# Patient Record
Sex: MALE | Race: OTHER | HISPANIC OR LATINO | Employment: FULL TIME | URBAN - NONMETROPOLITAN AREA
[De-identification: names, ages, dates, MRNs, and addresses within clinical notes are randomized per-mention and may not be internally consistent; named-entity substitution may affect disease eponyms.]

---

## 2022-03-10 ENCOUNTER — OFFICE VISIT (OUTPATIENT)
Dept: MEDICAL GROUP | Facility: PHYSICIAN GROUP | Age: 47
End: 2022-03-10
Payer: COMMERCIAL

## 2022-03-10 VITALS
TEMPERATURE: 96.6 F | RESPIRATION RATE: 16 BRPM | WEIGHT: 202.16 LBS | BODY MASS INDEX: 28.94 KG/M2 | OXYGEN SATURATION: 97 % | HEART RATE: 78 BPM | SYSTOLIC BLOOD PRESSURE: 152 MMHG | HEIGHT: 70 IN | DIASTOLIC BLOOD PRESSURE: 110 MMHG

## 2022-03-10 DIAGNOSIS — H91.93 BILATERAL HEARING LOSS, UNSPECIFIED HEARING LOSS TYPE: ICD-10-CM

## 2022-03-10 PROCEDURE — 99203 OFFICE O/P NEW LOW 30 MIN: CPT | Mod: 25 | Performed by: STUDENT IN AN ORGANIZED HEALTH CARE EDUCATION/TRAINING PROGRAM

## 2022-03-10 PROCEDURE — 69209 REMOVE IMPACTED EAR WAX UNI: CPT | Mod: 50 | Performed by: STUDENT IN AN ORGANIZED HEALTH CARE EDUCATION/TRAINING PROGRAM

## 2022-03-10 ASSESSMENT — ENCOUNTER SYMPTOMS
CHILLS: 0
HEADACHES: 0
COUGH: 0
FEVER: 0
BLURRED VISION: 0
DIZZINESS: 1

## 2022-03-10 ASSESSMENT — PATIENT HEALTH QUESTIONNAIRE - PHQ9: CLINICAL INTERPRETATION OF PHQ2 SCORE: 0

## 2022-03-10 NOTE — ASSESSMENT & PLAN NOTE
Bilateral cerumen removal via irrigation performed today.  Patient symptoms resolved thereafter.  I advised him to stop using Q-tips as it seems that he is packing the cerumen into the ear canal.  Follow-up in 1 to 2 weeks for an establish care visit

## 2022-03-10 NOTE — PROGRESS NOTES
"Subjective:        HISTORY OF THE PRESENT ILLNESS: Patient is a 46 y.o. male.  Presenting for:       Problem   Bilateral Hearing Loss    More loss in the left than the right for 1 week now.     Reports some dizziness  No fevers, denies nausea or vomiting  Some ringing in his ears.     Had had this before needed to get his ear cleaned.   No recent illness.     Uses qtips.     He denies getting hit in head  No blurry vision    No medications, no drugs.         No current Epic-ordered outpatient medications on file.     No current Epic-ordered facility-administered medications on file.       No past medical history on file.  No past surgical history on file.      No family history on file.  No current outpatient medications on file.     No current facility-administered medications for this visit.         Review of Systems   Constitutional: Negative for chills and fever.   HENT: Positive for ear pain and hearing loss. Negative for congestion.    Eyes: Negative for blurred vision.   Respiratory: Negative for cough.    Cardiovascular: Negative for chest pain.   Neurological: Positive for dizziness. Negative for headaches.          Objective:     Exam: /110   Pulse 78   Temp 35.9 °C (96.6 °F) (Temporal)   Resp 16   Ht 1.778 m (5' 10\")   Wt 91.7 kg (202 lb 2.6 oz)   SpO2 97%  Body mass index is 29.01 kg/m².    Physical Exam  Constitutional:       Appearance: Normal appearance. He is obese. He is not ill-appearing or diaphoretic.   HENT:      Right Ear: External ear normal. There is impacted cerumen.      Left Ear: External ear normal. There is impacted cerumen.   Eyes:      General: No scleral icterus.        Right eye: No discharge.         Left eye: No discharge.   Cardiovascular:      Rate and Rhythm: Normal rate and regular rhythm.      Pulses: Normal pulses.      Heart sounds: Normal heart sounds. No murmur heard.  Pulmonary:      Effort: Pulmonary effort is normal. No respiratory distress.      Breath " sounds: Normal breath sounds.   Neurological:      Mental Status: He is alert.            Assessment & Plan:   46 y.o. male with the following -      Problem List Items Addressed This Visit     Bilateral hearing loss     Bilateral cerumen removal via irrigation performed today.  Patient symptoms resolved thereafter.  I advised him to stop using Q-tips as it seems that he is packing the cerumen into the ear canal.  Follow-up in 1 to 2 weeks for an establish care visit

## 2022-03-25 ENCOUNTER — OFFICE VISIT (OUTPATIENT)
Dept: MEDICAL GROUP | Facility: PHYSICIAN GROUP | Age: 47
End: 2022-03-25
Payer: COMMERCIAL

## 2022-03-25 VITALS
TEMPERATURE: 98.1 F | RESPIRATION RATE: 16 BRPM | SYSTOLIC BLOOD PRESSURE: 144 MMHG | WEIGHT: 204.81 LBS | DIASTOLIC BLOOD PRESSURE: 88 MMHG | HEIGHT: 64 IN | HEART RATE: 90 BPM | BODY MASS INDEX: 34.97 KG/M2 | OXYGEN SATURATION: 97 %

## 2022-03-25 DIAGNOSIS — E66.9 OBESITY (BMI 30-39.9): ICD-10-CM

## 2022-03-25 DIAGNOSIS — Z00.00 HEALTH CARE MAINTENANCE: ICD-10-CM

## 2022-03-25 DIAGNOSIS — Z23 NEED FOR VACCINATION: ICD-10-CM

## 2022-03-25 DIAGNOSIS — I10 HYPERTENSION, UNSPECIFIED TYPE: ICD-10-CM

## 2022-03-25 PROCEDURE — 90471 IMMUNIZATION ADMIN: CPT | Performed by: STUDENT IN AN ORGANIZED HEALTH CARE EDUCATION/TRAINING PROGRAM

## 2022-03-25 PROCEDURE — 90715 TDAP VACCINE 7 YRS/> IM: CPT | Performed by: STUDENT IN AN ORGANIZED HEALTH CARE EDUCATION/TRAINING PROGRAM

## 2022-03-25 PROCEDURE — 99214 OFFICE O/P EST MOD 30 MIN: CPT | Mod: 25 | Performed by: STUDENT IN AN ORGANIZED HEALTH CARE EDUCATION/TRAINING PROGRAM

## 2022-03-25 RX ORDER — BLOOD PRESSURE TEST KIT
1 KIT MISCELLANEOUS ONCE
Qty: 1 KIT | Refills: 0 | Status: SHIPPED | OUTPATIENT
Start: 2022-03-25 | End: 2022-03-25

## 2022-03-25 RX ORDER — LISINOPRIL 10 MG/1
10 TABLET ORAL DAILY
Qty: 30 TABLET | Refills: 1 | Status: SHIPPED | OUTPATIENT
Start: 2022-03-25 | End: 2022-10-11

## 2022-03-25 ASSESSMENT — ENCOUNTER SYMPTOMS
DIZZINESS: 0
TREMORS: 0
FEVER: 0
HEADACHES: 0
SHORTNESS OF BREATH: 0
DIAPHORESIS: 0
CHILLS: 0
COUGH: 0
ABDOMINAL PAIN: 0
SPUTUM PRODUCTION: 0

## 2022-03-25 NOTE — ASSESSMENT & PLAN NOTE
Discussed diet, exercise, low-salt diet today.  Advised would have improvement on his blood pressures as well.  Patient agrees and understands.

## 2022-03-25 NOTE — ASSESSMENT & PLAN NOTE
Declines flu shot.  Tdap given today.  -No family history of colon cancer  -Family history, surgical history, social history reviewed today.  -Lipid panel, CBC, CMP, TSH ordered   -STD, HIV screening declined

## 2022-03-25 NOTE — PATIENT INSTRUCTIONS
Cómo tomarse la presión arterial  How to Take Your Blood Pressure  Usted puede annalise champagne presión arterial en casa con un aparato. Es posible que tenga que annalise champagne presión arterial en casa:  · Para pantera si tiene presión arterial elevada (hipertensión).  · Para controlar champagne presión arterial a lo leonarda del tiempo.  · Para asegurarse de que el medicamento para la presión arterial esté funcionando.  Materiales necesarios:  Necesitará un aparato de medición de la presión arterial o tensiómetro. Puede comprar latia en jamey farmacia o en línea. Al escoger latia:  · Escoja latia que tenga un brazalete.  · Escoja latia que se envuelva en la parte superior de champagne brazo. Debe poder meter únicamente un dedo entre el brazalete y el brazo.  · No escoja latia que mida champagne presión arterial en la gabby o el dedo.  Champagne médico puede sugerirle un tensiómetro.  Cómo prepararse  Evite realizar lo siguiente brianne los 30 minutos anteriores a controlar champagne presión arterial:  · Beber cafeína.  · Consumir alcohol.  · [place].  · Fumar.  · Realizar actividad física.  Ignacio minutos antes de controlar champagne presión arterial:  · Orine.  · Siéntese en jamey silla de comedor. Evite sentarse en un sillón blando o sofá.  · Esté tranquilo. No hable.  Cómo tomarse la presión arterial  Siga las instrucciones que vienen con el aparato. Si tiene un tensiómetro digital, las instrucciones podrían ser las siguientes:  1. Siéntese con la espalda recta.  2. Coloque los pies en el piso. No cruce los tobillos o las piernas.  3. Apoye el brazo bang al nivel del corazón. Puede apoyarlo en jamey mcdowell, escritorio o silla.  4. Arremánguese.  5. Envuelva la parte superior de champagne brazo bang con el brazalete. El brazalete debe estar 1 pulgada (2,5 cm) sobre champagne codo. Es mejor envolver el brazalete alrededor de la piel desnuda.  6. Ajuste el brazalete alrededor de champagne brazo. Debe poder meter únicamente un dedo entre el brazalete y el brazo.  7. Coloque el cable dentro del surco de champagne  "codo.  8. Presione el botón de encendido.  9. Quédese sentado tranquilamente mientras el brazalete se infla y se desinfla.  10. Escriba los números que se muestran en la pantalla.  11. Espere 2 o 3 minutos y repita los pasos 1 al 10.  ¿Qué significan los números?  Dos números conforman la presión arterial. El primer número es la presión sistólica. El jose número es la presión diastólica. Un ejemplo de lectura de presión arterial sería \"120 sobre 80\" (o 120/80).  Si es adulto y no tiene ninguna afección, use esta guía para saber si champagne presión arterial es normal:  Normal  · Primer número: debajo de 120.  · [place] número: debajo de 80.  [place]  · Primer número: 120-129.  · [place] número: debajo de 80.  Etapa 1 de hipertensión  · Primer número: 130-139.  · [place] número: 80-89.  Etapa 2 de hipertensión  · Primer número: 140 o más.  · [place] número: 90 o más.  Champagne presión arterial se encuentra por encima del nivel normal incluso si únicamente el número superior o el inferior es mayor que el normal.  Siga estas instrucciones en champagne casa:  · Controle champagne presión arterial con la frecuencia que le indique champagne médico.  · Lleve el tensiómetro a champagne próxima kim con el médico. Champagne médico:  ? Se asegurará de que lo esté usando correctamente.  ? Se asegurará de que funcione demetris.  · Se asegurará de que entienda cuáles deben ser pillo números de presión arterial.  · Dígale al médico si pillo medicamentos le causan efectos secundarios.  Comuníquese con un médico si:  · Champagne presión arterial sigue jewell.  Solicite ayuda de inmediato si:  · Champagne primer número de presión arterial es más alto que 180.  · Champagne jose número de presión arterial es más alto que 120.  Esta información no tiene shobha fin reemplazar el consejo del médico. Asegúrese de hacerle al médico cualquier pregunta que tenga.  Document Released: 04/03/2012 Document Revised: 02/21/2018 Document Reviewed: 05/26/2017  Elsevier Patient Education © 2020 [employer]    "

## 2022-03-25 NOTE — PROGRESS NOTES
HISTORY OF PRESENT ILLNESS: Jan is a pleasant 46 y.o. male, established patient who presents today to discuss medical problems as listed below:    Problem   High Blood Pressure    Elevated blood pressures in office last two times. Patient reports never been told this before.    Denies chest pain, Denson, edema, dizziness, headaches.  reprots sometime his vision is blurry but not at the moment.     He snores loud at night.  Does not wake up short of breath, denies being tired though out the day.      Obesity (Bmi 30-39.9)   Health Care Maintenance         Current Outpatient Medications Ordered in Epic   Medication Sig Dispense Refill   • Blood Pressure Kit 1 Each one time for 1 dose. 1 Kit 0   • lisinopril (PRINIVIL) 10 MG Tab Take 1 Tablet by mouth every day. 30 Tablet 1     No current Epic-ordered facility-administered medications on file.       Review of Systems   Constitutional: Negative for chills, diaphoresis, fever and malaise/fatigue.   Respiratory: Negative for cough, sputum production and shortness of breath.    Cardiovascular: Negative for chest pain.   Gastrointestinal: Negative for abdominal pain.   Neurological: Negative for dizziness, tremors and headaches.        No past medical history on file.  No past surgical history on file.  Social History     Tobacco Use   • Smoking status: Never Smoker   • Smokeless tobacco: Never Used   Vaping Use   • Vaping Use: Never used   Substance Use Topics   • Alcohol use: Not Currently     Comment: OCC   • Drug use: Never      Family History   Problem Relation Age of Onset   • Diabetes Mother    • Diabetes Father    • No Known Problems Brother      Current Outpatient Medications   Medication Sig Dispense Refill   • Blood Pressure Kit 1 Each one time for 1 dose. 1 Kit 0   • lisinopril (PRINIVIL) 10 MG Tab Take 1 Tablet by mouth every day. 30 Tablet 1     No current facility-administered medications for this visit.       Allergies:  No Known Allergies    Allergies, past  "medical history, past surgical history, family history, social history reviewed and updated.    Objective:    /88   Pulse 90   Temp 36.7 °C (98.1 °F) (Temporal)   Resp 16   Ht 1.62 m (5' 3.78\")   Wt 92.9 kg (204 lb 12.9 oz)   SpO2 97%   BMI 35.40 kg/m²    Body mass index is 35.4 kg/m².    Physical Exam  Vitals reviewed.   Constitutional:       General: He is not in acute distress.     Appearance: Normal appearance. He is not ill-appearing or toxic-appearing.   HENT:      Head: Normocephalic and atraumatic.      Mouth/Throat:      Pharynx: No posterior oropharyngeal erythema.   Eyes:      General: No scleral icterus.     Extraocular Movements: Extraocular movements intact.      Conjunctiva/sclera: Conjunctivae normal.   Cardiovascular:      Rate and Rhythm: Normal rate and regular rhythm.      Pulses: Normal pulses.      Heart sounds: Normal heart sounds. No murmur heard.  Pulmonary:      Effort: Pulmonary effort is normal. No respiratory distress.      Breath sounds: Normal breath sounds. No wheezing or rales.   Abdominal:      General: Abdomen is flat. Bowel sounds are normal. There is no distension.      Palpations: Abdomen is soft. There is no mass.      Tenderness: There is no abdominal tenderness.      Hernia: No hernia is present.   Musculoskeletal:      Cervical back: Neck supple.      Right lower leg: No edema.      Left lower leg: No edema.   Lymphadenopathy:      Cervical: No cervical adenopathy.   Skin:     General: Skin is warm and dry.   Neurological:      General: No focal deficit present.      Mental Status: He is alert and oriented to person, place, and time.   Psychiatric:         Mood and Affect: Mood normal.         Thought Content: Thought content normal.            Assessment/Plan:    Problem List Items Addressed This Visit     High blood pressure     Patient has moderately elevated blood pressures on 2 occasions now.  Asymptomatic.  We will start him on lisinopril 10 mg daily.  I " sent a prescription over for a blood pressure machine.  He is to check his blood pressures and log them prior to returning to his next visit in 1 month.  I given him directions on how to check his blood pressure.  At that time we will do a EKG for a baseline reading.    This is a new diagnosis with medical management.         Relevant Medications    lisinopril (PRINIVIL) 10 MG Tab    Other Relevant Orders    Comp Metabolic Panel    URINALYSIS    TSH WITH REFLEX TO FT4    Obesity (BMI 30-39.9)     Discussed diet, exercise, low-salt diet today.  Advised would have improvement on his blood pressures as well.  Patient agrees and understands.         Health care maintenance     Declines flu shot.  Tdap given today.  -No family history of colon cancer  -Family history, surgical history, social history reviewed today.  -Lipid panel, CBC, CMP, TSH ordered   -STD, HIV screening declined         Relevant Orders    Lipid Profile    CBC WITHOUT DIFFERENTIAL    HEMOGLOBIN A1C      Other Visit Diagnoses     Need for vaccination        Relevant Medications    Blood Pressure Kit    Other Relevant Orders    Tdap Vaccine =>8YO IM (Completed)    Influenza Vaccine Quad Injection (PF)           Return in about 4 weeks (around 4/22/2022).

## 2022-03-25 NOTE — ASSESSMENT & PLAN NOTE
Patient has moderately elevated blood pressures on 2 occasions now.  Asymptomatic.  We will start him on lisinopril 10 mg daily.  I sent a prescription over for a blood pressure machine.  He is to check his blood pressures and log them prior to returning to his next visit in 1 month.  I given him directions on how to check his blood pressure.  At that time we will do a EKG for a baseline reading.    This is a new diagnosis with medical management.

## 2022-03-26 ENCOUNTER — HOSPITAL ENCOUNTER (OUTPATIENT)
Dept: LAB | Facility: MEDICAL CENTER | Age: 47
End: 2022-03-26
Attending: STUDENT IN AN ORGANIZED HEALTH CARE EDUCATION/TRAINING PROGRAM
Payer: COMMERCIAL

## 2022-03-26 DIAGNOSIS — I10 HYPERTENSION, UNSPECIFIED TYPE: ICD-10-CM

## 2022-03-26 DIAGNOSIS — Z00.00 HEALTH CARE MAINTENANCE: ICD-10-CM

## 2022-03-26 LAB
ALBUMIN SERPL BCP-MCNC: 4.4 G/DL (ref 3.2–4.9)
ALBUMIN/GLOB SERPL: 1.7 G/DL
ALP SERPL-CCNC: 77 U/L (ref 30–99)
ALT SERPL-CCNC: 35 U/L (ref 2–50)
ANION GAP SERPL CALC-SCNC: 11 MMOL/L (ref 7–16)
AST SERPL-CCNC: 24 U/L (ref 12–45)
BILIRUB SERPL-MCNC: 1.2 MG/DL (ref 0.1–1.5)
BUN SERPL-MCNC: 17 MG/DL (ref 8–22)
CALCIUM SERPL-MCNC: 9.3 MG/DL (ref 8.5–10.5)
CHLORIDE SERPL-SCNC: 104 MMOL/L (ref 96–112)
CHOLEST SERPL-MCNC: 197 MG/DL (ref 100–199)
CO2 SERPL-SCNC: 23 MMOL/L (ref 20–33)
CREAT SERPL-MCNC: 0.75 MG/DL (ref 0.5–1.4)
ERYTHROCYTE [DISTWIDTH] IN BLOOD BY AUTOMATED COUNT: 40.2 FL (ref 35.9–50)
EST. AVERAGE GLUCOSE BLD GHB EST-MCNC: 128 MG/DL
FASTING STATUS PATIENT QL REPORTED: NORMAL
GFR SERPLBLD CREATININE-BSD FMLA CKD-EPI: 113 ML/MIN/1.73 M 2
GLOBULIN SER CALC-MCNC: 2.6 G/DL (ref 1.9–3.5)
GLUCOSE SERPL-MCNC: 112 MG/DL (ref 65–99)
HBA1C MFR BLD: 6.1 % (ref 4–5.6)
HCT VFR BLD AUTO: 49.7 % (ref 42–52)
HDLC SERPL-MCNC: 34 MG/DL
HGB BLD-MCNC: 16.7 G/DL (ref 14–18)
LDLC SERPL CALC-MCNC: 111 MG/DL
MCH RBC QN AUTO: 27.9 PG (ref 27–33)
MCHC RBC AUTO-ENTMCNC: 33.6 G/DL (ref 33.7–35.3)
MCV RBC AUTO: 83.1 FL (ref 81.4–97.8)
PLATELET # BLD AUTO: 326 K/UL (ref 164–446)
PMV BLD AUTO: 10.3 FL (ref 9–12.9)
POTASSIUM SERPL-SCNC: 4 MMOL/L (ref 3.6–5.5)
PROT SERPL-MCNC: 7 G/DL (ref 6–8.2)
RBC # BLD AUTO: 5.98 M/UL (ref 4.7–6.1)
SODIUM SERPL-SCNC: 138 MMOL/L (ref 135–145)
TRIGL SERPL-MCNC: 262 MG/DL (ref 0–149)
TSH SERPL DL<=0.005 MIU/L-ACNC: 1.87 UIU/ML (ref 0.38–5.33)
WBC # BLD AUTO: 8.8 K/UL (ref 4.8–10.8)

## 2022-03-26 PROCEDURE — 85027 COMPLETE CBC AUTOMATED: CPT

## 2022-03-26 PROCEDURE — 80053 COMPREHEN METABOLIC PANEL: CPT

## 2022-03-26 PROCEDURE — 84443 ASSAY THYROID STIM HORMONE: CPT

## 2022-03-26 PROCEDURE — 83036 HEMOGLOBIN GLYCOSYLATED A1C: CPT

## 2022-03-26 PROCEDURE — 80061 LIPID PANEL: CPT

## 2022-03-26 PROCEDURE — 36415 COLL VENOUS BLD VENIPUNCTURE: CPT

## 2022-04-26 ENCOUNTER — OFFICE VISIT (OUTPATIENT)
Dept: MEDICAL GROUP | Facility: PHYSICIAN GROUP | Age: 47
End: 2022-04-26
Payer: COMMERCIAL

## 2022-04-26 VITALS
OXYGEN SATURATION: 96 % | HEART RATE: 92 BPM | TEMPERATURE: 98.5 F | DIASTOLIC BLOOD PRESSURE: 88 MMHG | BODY MASS INDEX: 27.43 KG/M2 | HEIGHT: 70 IN | WEIGHT: 191.58 LBS | SYSTOLIC BLOOD PRESSURE: 130 MMHG | RESPIRATION RATE: 16 BRPM

## 2022-04-26 DIAGNOSIS — I10 PRIMARY HYPERTENSION: ICD-10-CM

## 2022-04-26 DIAGNOSIS — E78.2 MIXED HYPERLIPIDEMIA: ICD-10-CM

## 2022-04-26 PROBLEM — H91.93 BILATERAL HEARING LOSS: Status: RESOLVED | Noted: 2022-03-10 | Resolved: 2022-04-26

## 2022-04-26 PROCEDURE — 99213 OFFICE O/P EST LOW 20 MIN: CPT | Performed by: STUDENT IN AN ORGANIZED HEALTH CARE EDUCATION/TRAINING PROGRAM

## 2022-04-26 ASSESSMENT — FIBROSIS 4 INDEX: FIB4 SCORE: 0.56

## 2022-04-26 NOTE — PROGRESS NOTES
HISTORY OF PRESENT ILLNESS: Jan is a pleasant 46 y.o. male, established  patient who presents today to discuss medical problems as listed below:    Problem   Mixed Hyperlipidemia   High Blood Pressure    Patient was started on lisinopril 10 mg daily at the last visit.  His blood pressures are now well controlled amatory blood pressures running about 130 systolic over 80s diastolic.  He denies any chest pain, dyspnea on exertion, dizziness, headaches, edema.  He feels well.  He had a recent set of labs done     Bilateral Hearing Loss (Resolved)    More loss in the left than the right for 1 week now.     Reports some dizziness  No fevers, denies nausea or vomiting  Some ringing in his ears.     Had had this before needed to get his ear cleaned.   No recent illness.     Uses qtips.     He denies getting hit in head  No blurry vision    No medications, no drugs.           Current Outpatient Medications Ordered in Epic   Medication Sig Dispense Refill   • lisinopril (PRINIVIL) 10 MG Tab Take 1 Tablet by mouth every day. 30 Tablet 1     No current Epic-ordered facility-administered medications on file.       History reviewed. No pertinent past medical history.  History reviewed. No pertinent surgical history.  Social History     Tobacco Use   • Smoking status: Never Smoker   • Smokeless tobacco: Never Used   Vaping Use   • Vaping Use: Never used   Substance Use Topics   • Alcohol use: Not Currently     Comment: OCC   • Drug use: Never      Family History   Problem Relation Age of Onset   • Diabetes Mother    • Diabetes Father    • No Known Problems Brother      Current Outpatient Medications   Medication Sig Dispense Refill   • lisinopril (PRINIVIL) 10 MG Tab Take 1 Tablet by mouth every day. 30 Tablet 1     No current facility-administered medications for this visit.       Allergies:  No Known Allergies    Allergies, past medical history, past surgical history, family history, social history reviewed and  "updated.    Objective:    /88   Pulse 92   Temp 36.9 °C (98.5 °F) (Temporal)   Resp 16   Ht 1.778 m (5' 10\")   Wt 86.9 kg (191 lb 9.3 oz)   SpO2 96%   BMI 27.49 kg/m²    Body mass index is 27.49 kg/m².    Physical Exam  Vitals reviewed.   Constitutional:       General: He is not in acute distress.     Appearance: Normal appearance. He is not ill-appearing or toxic-appearing.   HENT:      Head: Normocephalic and atraumatic.   Eyes:      General: No scleral icterus.        Right eye: No discharge.         Left eye: No discharge.      Conjunctiva/sclera: Conjunctivae normal.   Neck:      Vascular: No carotid bruit.   Cardiovascular:      Rate and Rhythm: Normal rate and regular rhythm.      Pulses: Normal pulses.      Heart sounds: Normal heart sounds. No murmur heard.  Pulmonary:      Effort: Pulmonary effort is normal. No respiratory distress.      Breath sounds: Normal breath sounds. No wheezing or rales.   Abdominal:      General: Abdomen is flat. Bowel sounds are normal. There is no distension.      Palpations: Abdomen is soft. There is no mass.      Tenderness: There is no abdominal tenderness. There is no guarding or rebound.   Musculoskeletal:      Cervical back: Neck supple.      Right lower leg: No edema.      Left lower leg: No edema.   Skin:     General: Skin is warm and dry.   Neurological:      General: No focal deficit present.      Mental Status: He is alert.   Psychiatric:         Mood and Affect: Mood normal.         Thought Content: Thought content normal.          LABS:   Results for GIUSEPPE GOMES (MRN [id number]) as of 4/26/2022 15:23   Ref. Range 3/26/2022 11:05   WBC Latest Ref Range: 4.8 - 10.8 K/uL 8.8   RBC Latest Ref Range: 4.70 - 6.10 M/uL 5.98   Hemoglobin Latest Ref Range: 14.0 - 18.0 g/dL 16.7   Hematocrit Latest Ref Range: 42.0 - 52.0 % 49.7   MCV Latest Ref Range: 81.4 - 97.8 fL 83.1   MCH Latest Ref Range: 27.0 - 33.0 pg 27.9   MCHC Latest Ref Range: 33.7 - 35.3 " g/dL 33.6 (L)   RDW Latest Ref Range: 35.9 - 50.0 fL 40.2   Platelet Count Latest Ref Range: 164 - 446 K/uL 326   MPV Latest Ref Range: 9.0 - 12.9 fL 10.3   Sodium Latest Ref Range: 135 - 145 mmol/L 138   Potassium Latest Ref Range: 3.6 - 5.5 mmol/L 4.0   Chloride Latest Ref Range: 96 - 112 mmol/L 104   Co2 Latest Ref Range: 20 - 33 mmol/L 23   Anion Gap Latest Ref Range: 7.0 - 16.0  11.0   Glucose Latest Ref Range: 65 - 99 mg/dL 112 (H)   Bun Latest Ref Range: 8 - 22 mg/dL 17   Creatinine Latest Ref Range: 0.50 - 1.40 mg/dL 0.75   GFR (CKD-EPI) Latest Ref Range: >60 mL/min/1.73 m 2 113   Calcium Latest Ref Range: 8.5 - 10.5 mg/dL 9.3   AST(SGOT) Latest Ref Range: 12 - 45 U/L 24   ALT(SGPT) Latest Ref Range: 2 - 50 U/L 35   Alkaline Phosphatase Latest Ref Range: 30 - 99 U/L 77   Total Bilirubin Latest Ref Range: 0.1 - 1.5 mg/dL 1.2   Albumin Latest Ref Range: 3.2 - 4.9 g/dL 4.4   Total Protein Latest Ref Range: 6.0 - 8.2 g/dL 7.0   Globulin Latest Ref Range: 1.9 - 3.5 g/dL 2.6   A-G Ratio Latest Units: g/dL 1.7   Glycohemoglobin Latest Ref Range: 4.0 - 5.6 % 6.1 (H)   Estim. Avg Glu Latest Units: mg/dL 128   Fasting Status Unknown Fasting   Cholesterol,Tot Latest Ref Range: 100 - 199 mg/dL 197   Triglycerides Latest Ref Range: 0 - 149 mg/dL 262 (H)   HDL Latest Ref Range: >=40 mg/dL 34 (A)   LDL Latest Ref Range: <100 mg/dL 111 (H)   TSH Latest Ref Range: 0.380 - 5.330 uIU/mL 1.870           Assessment/Plan:    Problem List Items Addressed This Visit     High blood pressure     EKG in office today for initial diagnosis of hypertension.  EKG interpretation: Sinus rate and rhythm, normal axis, no T wave inversions.  No ST elevations.  Does not meet criteria for left ventricular hypertrophy.  Intervals within normal limits.    Creatinine, GFR within normal limits.    Continue amlodipine 10 mg daily return to care in 4 months for blood pressure check.           Relevant Orders    EKG - Clinic Performed    Mixed  hyperlipidemia     ASCVD risk less than 5%.  Discussed diet, exercise.                Return in about 4 months (around 8/26/2022) for blood pressure.   .

## 2022-04-26 NOTE — ASSESSMENT & PLAN NOTE
EKG in office today for initial diagnosis of hypertension.  EKG interpretation: Sinus rate and rhythm, normal axis, no T wave inversions.  No ST elevations.  Does not meet criteria for left ventricular hypertrophy.  Intervals within normal limits.    Creatinine, GFR within normal limits.    Continue amlodipine 10 mg daily return to care in 4 months for blood pressure check.

## 2022-09-09 ENCOUNTER — OFFICE VISIT (OUTPATIENT)
Dept: MEDICAL GROUP | Facility: PHYSICIAN GROUP | Age: 47
End: 2022-09-09
Payer: COMMERCIAL

## 2022-09-09 VITALS
RESPIRATION RATE: 16 BRPM | WEIGHT: 181.88 LBS | TEMPERATURE: 97.5 F | DIASTOLIC BLOOD PRESSURE: 94 MMHG | BODY MASS INDEX: 26.04 KG/M2 | HEIGHT: 70 IN | OXYGEN SATURATION: 97 % | HEART RATE: 89 BPM | SYSTOLIC BLOOD PRESSURE: 139 MMHG

## 2022-09-09 DIAGNOSIS — I10 HYPERTENSION, UNSPECIFIED TYPE: ICD-10-CM

## 2022-09-09 DIAGNOSIS — R73.03 PREDIABETES: ICD-10-CM

## 2022-09-09 DIAGNOSIS — Z23 NEED FOR VACCINATION: ICD-10-CM

## 2022-09-09 PROCEDURE — 90746 HEPB VACCINE 3 DOSE ADULT IM: CPT | Performed by: STUDENT IN AN ORGANIZED HEALTH CARE EDUCATION/TRAINING PROGRAM

## 2022-09-09 PROCEDURE — 90471 IMMUNIZATION ADMIN: CPT | Performed by: STUDENT IN AN ORGANIZED HEALTH CARE EDUCATION/TRAINING PROGRAM

## 2022-09-09 PROCEDURE — 99213 OFFICE O/P EST LOW 20 MIN: CPT | Mod: 25 | Performed by: STUDENT IN AN ORGANIZED HEALTH CARE EDUCATION/TRAINING PROGRAM

## 2022-09-09 ASSESSMENT — FIBROSIS 4 INDEX: FIB4 SCORE: 0.57

## 2022-09-10 NOTE — ASSESSMENT & PLAN NOTE
Reeducated patient on high blood pressure what his goals are and that he needs to start taking his medications daily.  Blood pressures today repeat 139/94.  Patient has been doing well with losing weight and cutting out sugars, salt, processed foods.    Chronic condition and exacerbation.     Advised patient to return to care if pressures are still elevated at home, otherwise return to care in 4 months for blood pressure check.

## 2022-09-10 NOTE — PROGRESS NOTES
"  HISTORY OF PRESENT ILLNESS: Jan is a pleasant 47 y.o. male, established patient who presents today to discuss medical problems as listed below:    Problem   Prediabetes   High Blood Pressure    Patient is here to follow-up on an emergency room encounter.  He originally went for abdominal pain was found to have gastroenteritis that is now resolved.  During the ER visit he was noted to have significantly elevated blood pressures and was told to follow-up with his primary care provider.  Today he reports that he has not been using his lisinopril because he thought that his blood pressure had \"resolved\".  Denies any chest pain, dyspnea on exertion, dizziness, edema, orthopnea, palpitations.  He feels well today          Current Outpatient Medications Ordered in Epic   Medication Sig Dispense Refill    lisinopril (PRINIVIL) 10 MG Tab Take 1 Tablet by mouth every day. 30 Tablet 1     No current Epic-ordered facility-administered medications on file.       Review of systems:  Per HPI    History reviewed. No pertinent past medical history.  History reviewed. No pertinent surgical history.  Social History     Tobacco Use    Smoking status: Never    Smokeless tobacco: Never   Vaping Use    Vaping Use: Never used   Substance Use Topics    Alcohol use: Not Currently     Comment: OCC    Drug use: Never      Family History   Problem Relation Age of Onset    Diabetes Mother     Diabetes Father     No Known Problems Brother      Current Outpatient Medications   Medication Sig Dispense Refill    lisinopril (PRINIVIL) 10 MG Tab Take 1 Tablet by mouth every day. 30 Tablet 1     No current facility-administered medications for this visit.       Allergies:  No Known Allergies    Allergies, past medical history, past surgical history, family history, social history reviewed and updated.    Objective:    BP (!) 139/94   Pulse 89   Temp 36.4 °C (97.5 °F) (Temporal)   Resp 16   Ht 1.778 m (5' 10\")   Wt 82.5 kg (181 lb 14.1 oz)   " SpO2 97%   BMI 26.10 kg/m²    Body mass index is 26.1 kg/m².    Physical exam:  General: Normal appearance, no acute distress, not ill-appearing  HEENT: EOM intact, conjunctiva normal limits, negative right/left eye discharge.  Sclera anicteric  Cardiovascular: Normal rate and rhythm, no murmurs  Pulmonary: No respiratory distress, no wheezing, no rales, breath sounds normal.  Abdomen: Bowel sounds normal, flat, soft.  Musculoskeletal: No edema bilaterally  Skin: Warm, dry, no lesions  Neurological: No focal deficits, normal gait  Psychiatric: Mood within normal limits    Assessment/Plan:    Problem List Items Addressed This Visit       High blood pressure     Reeducated patient on high blood pressure what his goals are and that he needs to start taking his medications daily.  Blood pressures today repeat 139/94.  Patient has been doing well with losing weight and cutting out sugars, salt, processed foods.    Chronic condition and exacerbation.     Advised patient to return to care if pressures are still elevated at home, otherwise return to care in 4 months for blood pressure check.         Prediabetes     Other Visit Diagnoses       Need for vaccination        Relevant Orders    Hepatitis B Vaccine Adult 20+ (Completed)             Return in about 4 months (around 1/9/2023) for blood pressure.

## 2022-10-10 DIAGNOSIS — I10 HYPERTENSION, UNSPECIFIED TYPE: ICD-10-CM

## 2022-10-11 RX ORDER — LISINOPRIL 10 MG/1
TABLET ORAL
Qty: 30 TABLET | Refills: 1 | Status: SHIPPED | OUTPATIENT
Start: 2022-10-11 | End: 2022-12-19

## 2022-12-17 DIAGNOSIS — I10 HYPERTENSION, UNSPECIFIED TYPE: ICD-10-CM

## 2022-12-19 RX ORDER — LISINOPRIL 10 MG/1
10 TABLET ORAL
Qty: 30 TABLET | Refills: 1 | Status: SHIPPED | OUTPATIENT
Start: 2022-12-19 | End: 2023-01-24 | Stop reason: SDUPTHER

## 2023-01-24 ENCOUNTER — OFFICE VISIT (OUTPATIENT)
Dept: MEDICAL GROUP | Facility: PHYSICIAN GROUP | Age: 48
End: 2023-01-24
Payer: COMMERCIAL

## 2023-01-24 VITALS
TEMPERATURE: 98.1 F | BODY MASS INDEX: 26.42 KG/M2 | WEIGHT: 184.53 LBS | HEART RATE: 73 BPM | HEIGHT: 70 IN | OXYGEN SATURATION: 97 % | SYSTOLIC BLOOD PRESSURE: 130 MMHG | RESPIRATION RATE: 16 BRPM | DIASTOLIC BLOOD PRESSURE: 90 MMHG

## 2023-01-24 DIAGNOSIS — E78.2 MIXED HYPERLIPIDEMIA: ICD-10-CM

## 2023-01-24 DIAGNOSIS — I10 HYPERTENSION, UNSPECIFIED TYPE: ICD-10-CM

## 2023-01-24 DIAGNOSIS — R73.03 PREDIABETES: ICD-10-CM

## 2023-01-24 DIAGNOSIS — Z23 NEED FOR VACCINATION: ICD-10-CM

## 2023-01-24 PROCEDURE — 99214 OFFICE O/P EST MOD 30 MIN: CPT | Mod: 25 | Performed by: STUDENT IN AN ORGANIZED HEALTH CARE EDUCATION/TRAINING PROGRAM

## 2023-01-24 PROCEDURE — 90746 HEPB VACCINE 3 DOSE ADULT IM: CPT | Performed by: STUDENT IN AN ORGANIZED HEALTH CARE EDUCATION/TRAINING PROGRAM

## 2023-01-24 PROCEDURE — 90686 IIV4 VACC NO PRSV 0.5 ML IM: CPT | Performed by: STUDENT IN AN ORGANIZED HEALTH CARE EDUCATION/TRAINING PROGRAM

## 2023-01-24 PROCEDURE — 90472 IMMUNIZATION ADMIN EACH ADD: CPT | Performed by: STUDENT IN AN ORGANIZED HEALTH CARE EDUCATION/TRAINING PROGRAM

## 2023-01-24 PROCEDURE — 90471 IMMUNIZATION ADMIN: CPT | Performed by: STUDENT IN AN ORGANIZED HEALTH CARE EDUCATION/TRAINING PROGRAM

## 2023-01-24 RX ORDER — LISINOPRIL 20 MG/1
20 TABLET ORAL DAILY
Qty: 90 TABLET | Refills: 0 | Status: SHIPPED | OUTPATIENT
Start: 2023-01-24 | End: 2023-04-26

## 2023-01-24 ASSESSMENT — FIBROSIS 4 INDEX: FIB4 SCORE: 0.57

## 2023-01-24 ASSESSMENT — PATIENT HEALTH QUESTIONNAIRE - PHQ9: CLINICAL INTERPRETATION OF PHQ2 SCORE: 0

## 2023-01-25 NOTE — PROGRESS NOTES
"  HISTORY OF PRESENT ILLNESS: Jan is a pleasant 47 y.o. male, established patient who presents today to discuss medical problems as listed below:    Patient is here to review his blood pressure.  He reports that he is still in the 90s diastolic range and sometimes occasionally in the 140 systolic.  He denies any chest pain, dyspnea exertion, edema, dizziness or headaches.  Reports that he is urinating well and has no side effects of the medications.  He is currently on lisinopril 10 mg daily    Current Outpatient Medications Ordered in Epic   Medication Sig Dispense Refill    lisinopril (PRINIVIL) 20 MG Tab Take 1 Tablet by mouth every day. 90 Tablet 0     No current Epic-ordered facility-administered medications on file.       Review of systems:  Per HPI    No past medical history on file.  No past surgical history on file.  Social History     Tobacco Use    Smoking status: Never    Smokeless tobacco: Never   Vaping Use    Vaping Use: Never used   Substance Use Topics    Alcohol use: Not Currently     Comment: OCC    Drug use: Never      Family History   Problem Relation Age of Onset    Diabetes Mother     Diabetes Father     No Known Problems Brother      Current Outpatient Medications   Medication Sig Dispense Refill    lisinopril (PRINIVIL) 20 MG Tab Take 1 Tablet by mouth every day. 90 Tablet 0     No current facility-administered medications for this visit.       Allergies:  No Known Allergies    Allergies, past medical history, past surgical history, family history, social history reviewed and updated.    Objective:    BP (!) 130/90 (BP Location: Right arm, Patient Position: Sitting, BP Cuff Size: Adult)   Pulse 73   Temp 36.7 °C (98.1 °F) (Temporal)   Resp 16   Ht 1.778 m (5' 10\")   Wt 83.7 kg (184 lb 8.4 oz)   SpO2 97%   BMI 26.48 kg/m²    Body mass index is 26.48 kg/m².    Physical exam:  General: Normal appearance, no acute distress, not ill-appearing  HEENT: EOM intact, conjunctiva normal limits, " negative right/left eye discharge.  Sclera anicteric  Cardiovascular: Normal rate and rhythm, no murmurs  Pulmonary: No respiratory distress, no wheezing, no rales, breath sounds normal.  Abdomen: Bowel sounds normal, flat, soft.  Musculoskeletal: No edema bilaterally  Skin: Warm, dry, no lesions  Neurological: No focal deficits, normal gait  Psychiatric: Mood within normal limits    Assessment/Plan:    Problem List Items Addressed This Visit       High blood pressure     Chronic condition not well controlled.  Blood pressure still slightly out of range in the 90 diastolic.  Increase lisinopril to 20 mg daily, prescription written for 90 days.    Patient will be due for yearly labs in 2 months, ordered BMP, lipid panel, hemoglobin A1c    Reminded patient his blood pressure goals and to return to care if still not within goal.  Otherwise we will see patient back in 6 months for review.  Advised to cut down on salt intake, decrease calories and exercise frequently at least 15 to 20 minutes/day         Relevant Medications    lisinopril (PRINIVIL) 20 MG Tab    Other Relevant Orders    Basic Metabolic Panel    Mixed hyperlipidemia    Relevant Medications    lisinopril (PRINIVIL) 20 MG Tab    Other Relevant Orders    Lipid Profile    Prediabetes    Relevant Orders    HEMOGLOBIN A1C     Other Visit Diagnoses       Need for vaccination        Relevant Orders    Influenza Vaccine Quad Injection (PF) (Completed)    Hepatitis B Vaccine Adult IM (Completed)             Return in about 6 months (around 7/24/2023) for blood pressure.

## 2023-01-25 NOTE — ASSESSMENT & PLAN NOTE
Chronic condition not well controlled.  Blood pressure still slightly out of range in the 90 diastolic.  Increase lisinopril to 20 mg daily, prescription written for 90 days.    Patient will be due for yearly labs in 2 months, ordered BMP, lipid panel, hemoglobin A1c    Reminded patient his blood pressure goals and to return to care if still not within goal.  Otherwise we will see patient back in 6 months for review.  Advised to cut down on salt intake, decrease calories and exercise frequently at least 15 to 20 minutes/day

## 2023-04-12 LAB — HBA1C MFR BLD: 5.8 % (ref ?–5.8)

## 2023-04-14 ENCOUNTER — TELEPHONE (OUTPATIENT)
Dept: MEDICAL GROUP | Facility: PHYSICIAN GROUP | Age: 48
End: 2023-04-14
Payer: COMMERCIAL

## 2023-04-14 NOTE — TELEPHONE ENCOUNTER
----- Message from Mandi Solo D.O. sent at 4/13/2023  3:25 PM PDT -----  Labs look ok, still has high cholesterol. Cont to exercise daily, decrease sugars and carbohydrates and stay away from fried foods    Víctor Solo DO

## 2023-04-26 DIAGNOSIS — I10 HYPERTENSION, UNSPECIFIED TYPE: ICD-10-CM

## 2023-04-26 RX ORDER — LISINOPRIL 20 MG/1
TABLET ORAL
Qty: 90 TABLET | Refills: 0 | Status: SHIPPED | OUTPATIENT
Start: 2023-04-26 | End: 2023-08-08 | Stop reason: SDUPTHER

## 2023-07-31 ENCOUNTER — APPOINTMENT (OUTPATIENT)
Dept: MEDICAL GROUP | Facility: PHYSICIAN GROUP | Age: 48
End: 2023-07-31
Payer: COMMERCIAL

## 2023-08-08 ENCOUNTER — OFFICE VISIT (OUTPATIENT)
Dept: MEDICAL GROUP | Facility: PHYSICIAN GROUP | Age: 48
End: 2023-08-08
Payer: COMMERCIAL

## 2023-08-08 VITALS
HEART RATE: 84 BPM | OXYGEN SATURATION: 96 % | SYSTOLIC BLOOD PRESSURE: 140 MMHG | DIASTOLIC BLOOD PRESSURE: 98 MMHG | BODY MASS INDEX: 25.91 KG/M2 | RESPIRATION RATE: 16 BRPM | WEIGHT: 180.56 LBS | TEMPERATURE: 97.4 F

## 2023-08-08 DIAGNOSIS — Z12.12 SCREENING FOR COLORECTAL CANCER: ICD-10-CM

## 2023-08-08 DIAGNOSIS — E78.2 MIXED HYPERLIPIDEMIA: ICD-10-CM

## 2023-08-08 DIAGNOSIS — I10 HYPERTENSION, UNSPECIFIED TYPE: ICD-10-CM

## 2023-08-08 DIAGNOSIS — Z12.11 SCREENING FOR COLORECTAL CANCER: ICD-10-CM

## 2023-08-08 DIAGNOSIS — R22.1 MASS OF NECK: ICD-10-CM

## 2023-08-08 DIAGNOSIS — Z23 NEED FOR VACCINATION: ICD-10-CM

## 2023-08-08 PROCEDURE — 90471 IMMUNIZATION ADMIN: CPT | Performed by: STUDENT IN AN ORGANIZED HEALTH CARE EDUCATION/TRAINING PROGRAM

## 2023-08-08 PROCEDURE — 99213 OFFICE O/P EST LOW 20 MIN: CPT | Mod: 25 | Performed by: STUDENT IN AN ORGANIZED HEALTH CARE EDUCATION/TRAINING PROGRAM

## 2023-08-08 PROCEDURE — 3080F DIAST BP >= 90 MM HG: CPT | Performed by: STUDENT IN AN ORGANIZED HEALTH CARE EDUCATION/TRAINING PROGRAM

## 2023-08-08 PROCEDURE — 3077F SYST BP >= 140 MM HG: CPT | Performed by: STUDENT IN AN ORGANIZED HEALTH CARE EDUCATION/TRAINING PROGRAM

## 2023-08-08 PROCEDURE — 90746 HEPB VACCINE 3 DOSE ADULT IM: CPT | Performed by: STUDENT IN AN ORGANIZED HEALTH CARE EDUCATION/TRAINING PROGRAM

## 2023-08-08 RX ORDER — LISINOPRIL 20 MG/1
20 TABLET ORAL
Qty: 90 TABLET | Refills: 1 | Status: SHIPPED | OUTPATIENT
Start: 2023-08-08 | End: 2024-02-06

## 2023-08-08 ASSESSMENT — FIBROSIS 4 INDEX: FIB4 SCORE: 0.58

## 2023-08-09 NOTE — PROGRESS NOTES
"  HISTORY OF PRESENT ILLNESS: Jan is a pleasant 48 y.o. male, established patient who presents today to discuss medical problems as listed below:    #Hypertension  Patient to follow-up on his blood pressures, he has been out of his medication for a few days now and has not been checking his blood pressures.  He denies any chest pain, dyspnea exertion, edema, headaches, dizziness.      Current Outpatient Medications Ordered in Epic   Medication Sig Dispense Refill    lisinopril (PRINIVIL) 20 MG Tab Take 1 Tablet by mouth every day. 90 Tablet 1     No current Epic-ordered facility-administered medications on file.       Review of systems:  Per HPI    No past medical history on file.  No past surgical history on file.  Social History     Tobacco Use    Smoking status: Never    Smokeless tobacco: Never   Vaping Use    Vaping Use: Never used   Substance Use Topics    Alcohol use: Not Currently     Comment: OCC    Drug use: Never      Family History   Problem Relation Age of Onset    Diabetes Mother     Diabetes Father     No Known Problems Brother      Current Outpatient Medications   Medication Sig Dispense Refill    lisinopril (PRINIVIL) 20 MG Tab Take 1 Tablet by mouth every day. 90 Tablet 1     No current facility-administered medications for this visit.       Allergies:  No Known Allergies    Allergies, past medical history, past surgical history, family history, social history reviewed and updated.    Objective:    BP (!) 140/98 (BP Location: Left arm, Patient Position: Sitting, BP Cuff Size: Adult)   Pulse 84   Temp 36.3 °C (97.4 °F) (Temporal)   Resp 16   Ht (P) 1.778 m (5' 10\")   Wt 81.9 kg (180 lb 8.9 oz)   SpO2 96%   BMI (P) 25.91 kg/m²    Body mass index is 25.91 kg/m² (pended).    Physical exam:  General: Normal appearance, no acute distress, not ill-appearing  HEENT: EOM intact, conjunctiva normal limits, negative right/left eye discharge.  Sclera anicteric  Cardiovascular: Normal rate and rhythm, " no murmurs  Pulmonary: No respiratory distress, no wheezing, no rales, breath sounds normal.  Musculoskeletal: No edema bilaterally.  Posterior neck soft mobile 5 cm diameter mass appreciated  Skin: Warm, dry, no lesions  Neurological: No focal deficits, normal gait  Psychiatric: Mood within normal limits    Assessment/Plan:    Problem List Items Addressed This Visit       High blood pressure     Refill lisinopril 20 mg daily    Advised patient to check his blood pressures daily and we will see him back next month for evaluation    Diet and exercise counseling given             Relevant Medications    lisinopril (PRINIVIL) 20 MG Tab    Mixed hyperlipidemia     ASCVD risk 5%  Advised on diet and exercise         Relevant Medications    lisinopril (PRINIVIL) 20 MG Tab     Other Visit Diagnoses       Screening for colorectal cancer        Relevant Orders    Referral to GI for Colonoscopy    Need for vaccination        Relevant Orders    Hepatitis B Vaccine Adult 20+ (Completed)    Mass of neck        Relevant Orders    US-SOFT TISSUES OF HEAD - NECK             Return in about 2 months (around 10/8/2023) for blood pressure.

## 2023-08-09 NOTE — ASSESSMENT & PLAN NOTE
Refill lisinopril 20 mg daily    Advised patient to check his blood pressures daily and we will see him back next month for evaluation    Diet and exercise counseling given

## 2023-09-15 ENCOUNTER — OFFICE VISIT (OUTPATIENT)
Dept: MEDICAL GROUP | Facility: PHYSICIAN GROUP | Age: 48
End: 2023-09-15
Payer: COMMERCIAL

## 2023-09-15 VITALS
DIASTOLIC BLOOD PRESSURE: 74 MMHG | BODY MASS INDEX: 26.07 KG/M2 | HEART RATE: 91 BPM | SYSTOLIC BLOOD PRESSURE: 102 MMHG | HEIGHT: 70 IN | TEMPERATURE: 96.8 F | WEIGHT: 182.1 LBS | RESPIRATION RATE: 14 BRPM | OXYGEN SATURATION: 95 %

## 2023-09-15 DIAGNOSIS — I10 HYPERTENSION, UNSPECIFIED TYPE: ICD-10-CM

## 2023-09-15 DIAGNOSIS — Z23 NEED FOR VACCINATION: ICD-10-CM

## 2023-09-15 PROCEDURE — 3078F DIAST BP <80 MM HG: CPT | Performed by: STUDENT IN AN ORGANIZED HEALTH CARE EDUCATION/TRAINING PROGRAM

## 2023-09-15 PROCEDURE — 3074F SYST BP LT 130 MM HG: CPT | Performed by: STUDENT IN AN ORGANIZED HEALTH CARE EDUCATION/TRAINING PROGRAM

## 2023-09-15 PROCEDURE — 99213 OFFICE O/P EST LOW 20 MIN: CPT | Mod: 25 | Performed by: STUDENT IN AN ORGANIZED HEALTH CARE EDUCATION/TRAINING PROGRAM

## 2023-09-15 PROCEDURE — 90686 IIV4 VACC NO PRSV 0.5 ML IM: CPT | Performed by: STUDENT IN AN ORGANIZED HEALTH CARE EDUCATION/TRAINING PROGRAM

## 2023-09-15 PROCEDURE — 90471 IMMUNIZATION ADMIN: CPT | Performed by: STUDENT IN AN ORGANIZED HEALTH CARE EDUCATION/TRAINING PROGRAM

## 2023-09-15 ASSESSMENT — FIBROSIS 4 INDEX: FIB4 SCORE: 0.58

## 2023-09-16 ENCOUNTER — HOSPITAL ENCOUNTER (OUTPATIENT)
Dept: RADIOLOGY | Facility: MEDICAL CENTER | Age: 48
End: 2023-09-16
Attending: STUDENT IN AN ORGANIZED HEALTH CARE EDUCATION/TRAINING PROGRAM
Payer: COMMERCIAL

## 2023-09-16 DIAGNOSIS — R22.1 MASS OF NECK: ICD-10-CM

## 2023-09-16 PROCEDURE — 76536 US EXAM OF HEAD AND NECK: CPT

## 2023-09-16 NOTE — PROGRESS NOTES
"  HISTORY OF PRESENT ILLNESS: Jan is a pleasant 48 y.o. male, established patient who presents today to discuss medical problems as listed below:    #High blood pressure  Last visit patient was not taking lisinopril daily and had elevated blood pressures.  Since then he has been taking his lisinopril 20 mg daily.  Reports his average blood pressures at home are 130/70.  Denies any chest pain, dyspnea on exertion, edema, dizziness or headaches.    Current Outpatient Medications Ordered in Epic   Medication Sig Dispense Refill    lisinopril (PRINIVIL) 20 MG Tab Take 1 Tablet by mouth every day. 90 Tablet 1     No current Epic-ordered facility-administered medications on file.       Review of systems:  Per [facility]    Patient Active Problem List    Diagnosis Date Noted    Prediabetes 09/09/2022    Mixed hyperlipidemia 04/26/2022    High blood pressure 03/25/2022    Obesity (BMI 30-39.9) 03/25/2022    Health care maintenance 03/25/2022     History reviewed. No pertinent surgical history.  Social History     Tobacco Use    Smoking status: Never    Smokeless tobacco: Never   Vaping Use    Vaping Use: Never used   Substance Use Topics    Alcohol use: Not Currently     Comment: OCC    Drug use: Never      Family History   Problem Relation Age of Onset    Diabetes Mother     Diabetes Father     No Known Problems Brother      Current Outpatient Medications   Medication Sig Dispense Refill    lisinopril (PRINIVIL) 20 MG Tab Take 1 Tablet by mouth every day. 90 Tablet 1     No current facility-administered medications for this visit.       Allergies:  No Known Allergies    Allergies, past medical history, past surgical history, family history, social history reviewed and updated.    Objective:    /74 (BP Location: Left arm, Patient Position: Sitting, BP Cuff Size: Adult)   Pulse 91   Temp 36 °C (96.8 °F) (Temporal)   Resp 14   Ht 1.778 m (5' 10\")   Wt 82.6 kg (182 lb 1.6 oz)   SpO2 95%   BMI 26.13 kg/m²    Body mass " index is 26.13 kg/m².    Physical exam:  General: Normal appearance, no acute distress, not ill-appearing  HEENT: EOM intact, conjunctiva normal limits, negative right/left eye discharge.  Sclera anicteric  Cardiovascular: Normal rate and rhythm, no murmurs  Pulmonary: No respiratory distress, no wheezing, no rales, breath sounds normal.    Musculoskeletal: No edema bilaterally  Skin: Warm, dry, no lesions  Neurological: No focal deficits, normal gait  Psychiatric: Mood within normal limits    Assessment/Plan:    Problem List Items Addressed This Visit       High blood pressure     Continue with lisinopril 20 mg daily.  F/u 6 m with blood pressure and logs and bring machine to clinic    Chronic stable cond          Other Visit Diagnoses       Need for vaccination        Relevant Orders    INFLUENZA VACCINE QUAD INJ (PF) (Completed)             Return in about 6 months (around 3/15/2024) for blood pressure.

## 2024-02-03 DIAGNOSIS — I10 HYPERTENSION, UNSPECIFIED TYPE: ICD-10-CM

## 2024-02-05 NOTE — TELEPHONE ENCOUNTER
Received request via: Pharmacy    Was the patient seen in the last year in this department? Yes    Does the patient have an active prescription (recently filled or refills available) for medication(s) requested? No    Pharmacy Name: [facility] pharmacy     Does the patient have [facility] and need 100 day supply (blood pressure, diabetes and cholesterol meds only)? Patient does not have SCP

## 2024-02-06 RX ORDER — LISINOPRIL 20 MG/1
20 TABLET ORAL DAILY
Qty: 90 TABLET | Refills: 0 | Status: SHIPPED | OUTPATIENT
Start: 2024-02-06 | End: 2024-03-26 | Stop reason: SDUPTHER

## 2024-03-14 ENCOUNTER — APPOINTMENT (OUTPATIENT)
Dept: MEDICAL GROUP | Facility: PHYSICIAN GROUP | Age: 49
End: 2024-03-14
Payer: COMMERCIAL

## 2024-03-26 ENCOUNTER — TELEPHONE (OUTPATIENT)
Dept: MEDICAL GROUP | Facility: PHYSICIAN GROUP | Age: 49
End: 2024-03-26
Payer: COMMERCIAL

## 2024-03-26 DIAGNOSIS — I10 HYPERTENSION, UNSPECIFIED TYPE: ICD-10-CM

## 2024-03-26 RX ORDER — LISINOPRIL 20 MG/1
20 TABLET ORAL DAILY
Qty: 90 TABLET | Refills: 1 | Status: SHIPPED | OUTPATIENT
Start: 2024-03-26

## 2024-03-26 NOTE — TELEPHONE ENCOUNTER
Received request via: Patient    Was the patient seen in the last year in this department? Yes    Does the patient have an active prescription (recently filled or refills available) for medication(s) requested? No    Pharmacy Name: [facility] pharmacy     Does the patient have [facility] and need 100 day supply (blood pressure, diabetes and cholesterol meds only)? Patient does not have SCP

## 2024-03-27 ENCOUNTER — APPOINTMENT (OUTPATIENT)
Dept: MEDICAL GROUP | Facility: PHYSICIAN GROUP | Age: 49
End: 2024-03-27

## 2024-06-23 DIAGNOSIS — I10 HYPERTENSION, UNSPECIFIED TYPE: ICD-10-CM

## 2024-06-24 NOTE — TELEPHONE ENCOUNTER
Received request via: Pharmacy    Was the patient seen in the last year in this department? Yes    Does the patient have an active prescription (recently filled or refills available) for medication(s) requested? No    Pharmacy Name: [facility] pharmacy     Does the patient have [facility] and need 100 day supply (blood pressure, diabetes and cholesterol meds only)? Patient does not have SCP

## 2024-06-25 RX ORDER — LISINOPRIL 20 MG/1
20 TABLET ORAL DAILY
Qty: 90 TABLET | Refills: 2 | Status: SHIPPED | OUTPATIENT
Start: 2024-06-25